# Patient Record
Sex: FEMALE | Race: WHITE
[De-identification: names, ages, dates, MRNs, and addresses within clinical notes are randomized per-mention and may not be internally consistent; named-entity substitution may affect disease eponyms.]

---

## 2018-07-31 ENCOUNTER — HOSPITAL ENCOUNTER (EMERGENCY)
Dept: HOSPITAL 9 - MADERS | Age: 43
Discharge: HOME | End: 2018-07-31
Payer: SELF-PAY

## 2018-07-31 DIAGNOSIS — Z87.891: ICD-10-CM

## 2018-07-31 DIAGNOSIS — E03.9: ICD-10-CM

## 2018-07-31 DIAGNOSIS — I10: ICD-10-CM

## 2018-07-31 DIAGNOSIS — H20.9: ICD-10-CM

## 2018-07-31 DIAGNOSIS — S05.01XA: Primary | ICD-10-CM
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
## 2018-07-31 PROCEDURE — 99283 EMERGENCY DEPT VISIT LOW MDM: CPT

## 2019-11-10 ENCOUNTER — HOSPITAL ENCOUNTER (EMERGENCY)
Dept: HOSPITAL 92 - ERS | Age: 44
Discharge: HOME | End: 2019-11-10
Payer: SELF-PAY

## 2019-11-10 DIAGNOSIS — E03.9: ICD-10-CM

## 2019-11-10 DIAGNOSIS — S30.0XXA: Primary | ICD-10-CM

## 2019-11-10 DIAGNOSIS — Z87.891: ICD-10-CM

## 2019-11-10 DIAGNOSIS — Z79.899: ICD-10-CM
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
## 2019-11-10 PROCEDURE — 72220 X-RAY EXAM SACRUM TAILBONE: CPT

## 2019-11-10 PROCEDURE — 72100 X-RAY EXAM L-S SPINE 2/3 VWS: CPT

## 2019-11-10 NOTE — RAD
Radiograph sacrum and coccyx 3 views:



DATE:

11/10/2019



HISTORY:

Acute, traumatic tailbone pain in 44-year-old female after fall.



FINDINGS:

Arcuate lines are preserved. No grossly displaced fracture is identified.



IMPRESSION:

Negative.



Reported By: Javi Wilkins 

Electronically Signed:  11/10/2019 2:34 PM

## 2019-11-10 NOTE — RAD
RADIOGRAPH LUMBAR SPINE 2 VIEWS:



DATE:

11/10/2019



HISTORY:

44-year-old female with acute traumatic low back pain after fall.



FINDINGS:

There are 5 lumbar-type vertebrae. Alignment is normal. Vertebral body heights and disc spaces are ma
intained. There is no evidence of fracture, significant osteophytes, or any other focal osseous

abnormality.



IMPRESSION:

Normal.



Reported By: Javi Wilkins 

Electronically Signed:  11/10/2019 2:33 PM

## 2022-07-02 ENCOUNTER — HOSPITAL ENCOUNTER (EMERGENCY)
Dept: HOSPITAL 92 - CSHERS | Age: 47
Discharge: HOME | End: 2022-07-02
Payer: SELF-PAY

## 2022-07-02 DIAGNOSIS — R60.0: Primary | ICD-10-CM

## 2022-07-02 DIAGNOSIS — F17.290: ICD-10-CM

## 2022-07-02 DIAGNOSIS — I10: ICD-10-CM

## 2022-07-02 DIAGNOSIS — Z79.899: ICD-10-CM

## 2022-07-02 DIAGNOSIS — E03.9: ICD-10-CM

## 2022-07-02 LAB
ALBUMIN SERPL BCG-MCNC: 3.8 G/DL (ref 3.5–5)
ALP SERPL-CCNC: 64 U/L (ref 40–110)
ALT SERPL W P-5'-P-CCNC: 11 U/L (ref 8–55)
ANION GAP SERPL CALC-SCNC: 15 MMOL/L (ref 10–20)
AST SERPL-CCNC: 13 U/L (ref 5–34)
BASOPHILS # BLD AUTO: 0.1 10X3/UL (ref 0–0.2)
BASOPHILS NFR BLD AUTO: 1 % (ref 0–2)
BILIRUB SERPL-MCNC: 0.3 MG/DL (ref 0.2–1.2)
BUN SERPL-MCNC: 11 MG/DL (ref 7–18.7)
CALCIUM SERPL-MCNC: 9.1 MG/DL (ref 7.8–10.44)
CHLORIDE SERPL-SCNC: 104 MMOL/L (ref 98–107)
CO2 SERPL-SCNC: 22 MMOL/L (ref 22–29)
CREAT CL PREDICTED SERPL C-G-VRATE: 0 ML/MIN (ref 70–130)
EOSINOPHIL # BLD AUTO: 0.2 10X3/UL (ref 0–0.5)
EOSINOPHIL NFR BLD AUTO: 2.2 % (ref 0–6)
GLOBULIN SER CALC-MCNC: 3.2 G/DL (ref 2.4–3.5)
GLUCOSE SERPL-MCNC: 103 MG/DL (ref 70–105)
HGB BLD-MCNC: 11.8 G/DL (ref 12–15.5)
LYMPHOCYTES NFR BLD AUTO: 21.7 % (ref 18–47)
MCH RBC QN AUTO: 29 PG (ref 27–33)
MCV RBC AUTO: 88.2 FL (ref 81.6–98.3)
MONOCYTES # BLD AUTO: 0.9 10X3/UL (ref 0–1.1)
MONOCYTES NFR BLD AUTO: 11.4 % (ref 0–10)
NEUTROPHILS # BLD AUTO: 5.2 10X3/UL (ref 1.5–8.4)
NEUTROPHILS NFR BLD AUTO: 63.2 % (ref 40–75)
PLATELET # BLD AUTO: 267 10X3/UL (ref 150–450)
POTASSIUM SERPL-SCNC: 3.7 MMOL/L (ref 3.5–5.1)
RBC # BLD AUTO: 4.07 10X6/UL (ref 3.9–5.03)
SODIUM SERPL-SCNC: 137 MMOL/L (ref 136–145)
WBC # BLD AUTO: 8.2 10X3/UL (ref 3.5–10.5)

## 2022-07-02 PROCEDURE — 85025 COMPLETE CBC W/AUTO DIFF WBC: CPT

## 2022-07-02 PROCEDURE — 96372 THER/PROPH/DIAG INJ SC/IM: CPT

## 2022-07-02 PROCEDURE — 86140 C-REACTIVE PROTEIN: CPT

## 2022-07-02 PROCEDURE — 80053 COMPREHEN METABOLIC PANEL: CPT

## 2022-07-02 PROCEDURE — 85652 RBC SED RATE AUTOMATED: CPT
